# Patient Record
Sex: MALE | Race: WHITE | NOT HISPANIC OR LATINO | Employment: OTHER | URBAN - METROPOLITAN AREA
[De-identification: names, ages, dates, MRNs, and addresses within clinical notes are randomized per-mention and may not be internally consistent; named-entity substitution may affect disease eponyms.]

---

## 2023-10-25 ENCOUNTER — APPOINTMENT (OUTPATIENT)
Dept: CT IMAGING | Facility: HOSPITAL | Age: 73
End: 2023-10-25
Payer: OTHER GOVERNMENT

## 2023-10-25 ENCOUNTER — APPOINTMENT (OUTPATIENT)
Dept: GENERAL RADIOLOGY | Facility: HOSPITAL | Age: 73
End: 2023-10-25
Payer: OTHER GOVERNMENT

## 2023-10-25 ENCOUNTER — HOSPITAL ENCOUNTER (EMERGENCY)
Facility: HOSPITAL | Age: 73
Discharge: HOME | End: 2023-10-25
Attending: EMERGENCY MEDICINE | Admitting: EMERGENCY MEDICINE
Payer: OTHER GOVERNMENT

## 2023-10-25 VITALS
HEIGHT: 70 IN | HEART RATE: 59 BPM | BODY MASS INDEX: 26.63 KG/M2 | WEIGHT: 186 LBS | OXYGEN SATURATION: 97 % | DIASTOLIC BLOOD PRESSURE: 93 MMHG | RESPIRATION RATE: 16 BRPM | SYSTOLIC BLOOD PRESSURE: 142 MMHG | TEMPERATURE: 97.5 F

## 2023-10-25 DIAGNOSIS — F07.81 POST CONCUSSION SYNDROME: Primary | ICD-10-CM

## 2023-10-25 DIAGNOSIS — S40.011A CONTUSION OF RIGHT SHOULDER, INITIAL ENCOUNTER: ICD-10-CM

## 2023-10-25 DIAGNOSIS — S90.121A CONTUSION OF FIFTH TOE OF RIGHT FOOT, INITIAL ENCOUNTER: ICD-10-CM

## 2023-10-25 LAB
ALBUMIN SERPL-MCNC: 4 G/DL (ref 3.5–5.2)
ALBUMIN/GLOB SERPL: 1.4 G/DL
ALP SERPL-CCNC: 73 U/L (ref 39–117)
ALT SERPL W P-5'-P-CCNC: 19 U/L (ref 1–41)
ANION GAP SERPL CALCULATED.3IONS-SCNC: 9 MMOL/L (ref 5–15)
AST SERPL-CCNC: 21 U/L (ref 1–40)
BASOPHILS # BLD AUTO: 0.03 10*3/MM3 (ref 0–0.2)
BASOPHILS NFR BLD AUTO: 0.5 % (ref 0–1.5)
BILIRUB SERPL-MCNC: 0.5 MG/DL (ref 0–1.2)
BUN SERPL-MCNC: 10 MG/DL (ref 8–23)
BUN/CREAT SERPL: 10.3 (ref 7–25)
CALCIUM SPEC-SCNC: 9.4 MG/DL (ref 8.6–10.5)
CHLORIDE SERPL-SCNC: 99 MMOL/L (ref 98–107)
CO2 SERPL-SCNC: 27 MMOL/L (ref 22–29)
CREAT SERPL-MCNC: 0.97 MG/DL (ref 0.76–1.27)
DEPRECATED RDW RBC AUTO: 40.8 FL (ref 37–54)
EGFRCR SERPLBLD CKD-EPI 2021: 83.5 ML/MIN/1.73
EOSINOPHIL # BLD AUTO: 0.06 10*3/MM3 (ref 0–0.4)
EOSINOPHIL NFR BLD AUTO: 1 % (ref 0.3–6.2)
ERYTHROCYTE [DISTWIDTH] IN BLOOD BY AUTOMATED COUNT: 12.7 % (ref 12.3–15.4)
GLOBULIN UR ELPH-MCNC: 2.8 GM/DL
GLUCOSE SERPL-MCNC: 102 MG/DL (ref 65–99)
HCT VFR BLD AUTO: 44.5 % (ref 37.5–51)
HGB BLD-MCNC: 15.2 G/DL (ref 13–17.7)
HOLD SPECIMEN: NORMAL
HOLD SPECIMEN: NORMAL
IMM GRANULOCYTES # BLD AUTO: 0.02 10*3/MM3 (ref 0–0.05)
IMM GRANULOCYTES NFR BLD AUTO: 0.3 % (ref 0–0.5)
LYMPHOCYTES # BLD AUTO: 0.95 10*3/MM3 (ref 0.7–3.1)
LYMPHOCYTES NFR BLD AUTO: 15.6 % (ref 19.6–45.3)
MCH RBC QN AUTO: 30.1 PG (ref 26.6–33)
MCHC RBC AUTO-ENTMCNC: 34.2 G/DL (ref 31.5–35.7)
MCV RBC AUTO: 88.1 FL (ref 79–97)
MONOCYTES # BLD AUTO: 0.45 10*3/MM3 (ref 0.1–0.9)
MONOCYTES NFR BLD AUTO: 7.4 % (ref 5–12)
NEUTROPHILS NFR BLD AUTO: 4.57 10*3/MM3 (ref 1.7–7)
NEUTROPHILS NFR BLD AUTO: 75.2 % (ref 42.7–76)
NRBC BLD AUTO-RTO: 0 /100 WBC (ref 0–0.2)
PLATELET # BLD AUTO: 149 10*3/MM3 (ref 140–450)
PMV BLD AUTO: 9.6 FL (ref 6–12)
POTASSIUM SERPL-SCNC: 4.3 MMOL/L (ref 3.5–5.2)
PROT SERPL-MCNC: 6.8 G/DL (ref 6–8.5)
QT INTERVAL: 425 MS
QTC INTERVAL: 414 MS
RBC # BLD AUTO: 5.05 10*6/MM3 (ref 4.14–5.8)
SODIUM SERPL-SCNC: 135 MMOL/L (ref 136–145)
TROPONIN T SERPL HS-MCNC: 10 NG/L
WBC NRBC COR # BLD: 6.08 10*3/MM3 (ref 3.4–10.8)
WHOLE BLOOD HOLD COAG: NORMAL
WHOLE BLOOD HOLD SPECIMEN: NORMAL

## 2023-10-25 PROCEDURE — 90471 IMMUNIZATION ADMIN: CPT | Performed by: PHYSICIAN ASSISTANT

## 2023-10-25 PROCEDURE — 93005 ELECTROCARDIOGRAM TRACING: CPT

## 2023-10-25 PROCEDURE — 84484 ASSAY OF TROPONIN QUANT: CPT | Performed by: EMERGENCY MEDICINE

## 2023-10-25 PROCEDURE — 73630 X-RAY EXAM OF FOOT: CPT

## 2023-10-25 PROCEDURE — 90715 TDAP VACCINE 7 YRS/> IM: CPT | Performed by: PHYSICIAN ASSISTANT

## 2023-10-25 PROCEDURE — 99284 EMERGENCY DEPT VISIT MOD MDM: CPT

## 2023-10-25 PROCEDURE — 93010 ELECTROCARDIOGRAM REPORT: CPT | Performed by: INTERNAL MEDICINE

## 2023-10-25 PROCEDURE — 73030 X-RAY EXAM OF SHOULDER: CPT

## 2023-10-25 PROCEDURE — 36415 COLL VENOUS BLD VENIPUNCTURE: CPT

## 2023-10-25 PROCEDURE — 25810000003 SODIUM CHLORIDE 0.9 % SOLUTION: Performed by: EMERGENCY MEDICINE

## 2023-10-25 PROCEDURE — 70450 CT HEAD/BRAIN W/O DYE: CPT

## 2023-10-25 PROCEDURE — 85025 COMPLETE CBC W/AUTO DIFF WBC: CPT | Performed by: EMERGENCY MEDICINE

## 2023-10-25 PROCEDURE — 25010000002 TETANUS-DIPHTH-ACELL PERTUSSIS 5-2.5-18.5 LF-MCG/0.5 SUSPENSION PREFILLED SYRINGE: Performed by: PHYSICIAN ASSISTANT

## 2023-10-25 PROCEDURE — 72125 CT NECK SPINE W/O DYE: CPT

## 2023-10-25 PROCEDURE — 80053 COMPREHEN METABOLIC PANEL: CPT | Performed by: EMERGENCY MEDICINE

## 2023-10-25 PROCEDURE — 71045 X-RAY EXAM CHEST 1 VIEW: CPT

## 2023-10-25 RX ORDER — SODIUM CHLORIDE 0.9 % (FLUSH) 0.9 %
10 SYRINGE (ML) INJECTION AS NEEDED
Status: DISCONTINUED | OUTPATIENT
Start: 2023-10-25 | End: 2023-10-25 | Stop reason: HOSPADM

## 2023-10-25 RX ADMIN — TETANUS TOXOID, REDUCED DIPHTHERIA TOXOID AND ACELLULAR PERTUSSIS VACCINE, ADSORBED 0.5 ML: 5; 2.5; 8; 8; 2.5 SUSPENSION INTRAMUSCULAR at 13:53

## 2023-10-25 RX ADMIN — SODIUM CHLORIDE 500 ML: 9 INJECTION, SOLUTION INTRAVENOUS at 13:02

## 2023-10-25 NOTE — ED PROVIDER NOTES
"The TOYA and I have discussed this patient's history, physical exam, and treatment plan.  I have reviewed the documentation and personally had a face to face interaction with the patient. I affirm the documentation and agree with the treatment and plan.  The attached note describes my personal findings.      I provided a substantive portion of the care of the patient.  I personally performed the physical exam in its entirety, and below are my findings.     Brief history of present illness: 73-year-old male presents with injury sustained from a fall today at work.  Since the injury which included bumping his head he felt a little bit woozy but feels better now.  Denies headache, focal numbness or weakness, chest pain or shortness of breath.  He bumped the right side of his forehead, sustained an acute on chronic injury to his right shoulder and hit his right foot and complains of some acute on chronic first MTP pain.    Physical exam:    /74   Pulse 60   Temp 97.5 °F (36.4 °C)   Resp 16   Ht 177.8 cm (70\")   Wt 84.4 kg (186 lb)   SpO2 100%   BMI 26.69 kg/m²       Physical Exam   Constitutional: No distress.  Nontoxic  HENT:  Head: Normocephalic .  Small hematoma right forehead with no depressed lesion or other findings of basilar skull fracture  Oropharynx: Mucous membranes are moist.   Eyes: . No scleral icterus.   Neck: Normal range of motion. Neck supple.   Cardiovascular: Pink warm and well perfused throughout.    Pulmonary/Chest: No respiratory distress.    Abdominal: Soft. There is no rebound or rigidity  Musculoskeletal: See discomfort with palpation all about the right shoulder with no gross deformity appreciated..  No deformity of the toes of the right foot.  Neurological: Alert and oriented.  Speech fluent and easily intelligible  Skin: Skin is pink, warm, and dry.   Psychiatric: Mood and affect normal.   Nursing note and vitals reviewed.        MDM: Agree with planned laboratory and radiologic " evaluation    Please note that portions of this were completed with a voice recognition program.       Note Disclaimer: At [facility], we believe that sharing information builds trust and better relationships. You are receiving this note because you are receiving care at [facility] or recently visited. It is possible you will see health information before a provider has talked with you about it. This kind of information can be easy to misunderstand. To help you fully understand what it means for your health, we urge you to discuss this note with your provider.     Rashard Villegas MD  10/25/23 4258

## 2023-10-25 NOTE — CASE MANAGEMENT/SOCIAL WORK
ER provider notified that patient has [employer] insurance. CCP to follow up should patient require admission to the hospital today. AGUEDA Curry RN

## 2023-10-25 NOTE — ED TRIAGE NOTES
Patient to ED via EMS from work. Patient had a syncopal episode while working. Patient reports he hit the right side of his head. Patient reports he did have tunnel vision and was able to come to before fully passing out.

## 2023-10-25 NOTE — ED PROVIDER NOTES
EMERGENCY DEPARTMENT ENCOUNTER    Room Number:  32/32  PCP: Provider, No Known  Discussed/ obtained information from independent historians: patient      HPI:  Chief Complaint: fall down stairs  A complete HPI/ROS/PMH/PSH/SH/FH are unobtainable due to: none  Context: Frank Lawrence is a 73 y.o. male who presents to the ED c/o injuries related to a fall downstairs.  He states he was at the top of some steps clearing some cobwebs while at work.  He stepped off the top stair causing him to fall down 9 concrete stairs.  He has pretty significant right shoulder pain, did strike his head but denies any loss of consciousness nausea vomiting or vision changes.  Denies any neck or back pain chest pain or abdomen pain.  He does have an abrasion to his right knee and his right great toe is hurting.  He is not anticoagulated.  After the fall he was ambulatory, put away his ladder and tools, walked his van and then came to the ER via ambulance for further evaluation and treatment.  He did have a couple of near syncopal episodes after the injury where he felt like he was going to pass out but ultimately did not.  He denies any chest pain or shortness of breath.  Patient does not recall ever having a tetanus vaccination      External (non-ED) record review: Immunization record reviewed, no tetanus vaccination on file.      PAST MEDICAL HISTORY  Active Ambulatory Problems     Diagnosis Date Noted    No Active Ambulatory Problems     Resolved Ambulatory Problems     Diagnosis Date Noted    No Resolved Ambulatory Problems     No Additional Past Medical History         PAST SURGICAL HISTORY  No past surgical history on file.      FAMILY HISTORY  No family history on file.      SOCIAL HISTORY  Social History     Socioeconomic History    Marital status:          ALLERGIES  Patient has no known allergies.        REVIEW OF SYSTEMS  Review of Systems         PHYSICAL EXAM  ED Triage Vitals [10/25/23 1040]   Temp Heart Rate Resp BP  SpO2   97.5 °F (36.4 °C) 60 16 131/74 100 %      Temp src Heart Rate Source Patient Position BP Location FiO2 (%)   -- -- -- -- --       Physical Exam      GENERAL: no acute distress  HENT: normocephalic, small area contusion to the right frontal forehead, approximately the size of a nickel.  No hemotympanum davis sign raccoon eyes septal hematoma otorrhea or rhinorrhea, no facial bone tenderness or deformity  EYES: no scleral icterus, PERRL, extract movements intact  CV: regular rhythm, normal rate  RESPIRATORY: normal effort, CTA B, no chest wall tenderness  ABDOMEN: nondistended soft nontender, no signs of trauma  MUSCULOSKELETAL: no deformity, no C, T, L-spine tenderness.  Full range of motion of the neck without pain.  There is a superficial abrasion to the right knee, right knee is otherwise nontender with full range of motion.  He has some tenderness to the right great toe.  No other tenderness to the foot, DP PT pulses 2+, extremities are otherwise atraumatic with full range of motion and nontender.  NEURO: alert, moves all extremities, follows commands  PSYCH:  calm, cooperative  SKIN: warm, dry    Vital signs and nursing notes reviewed.          LAB RESULTS  Recent Results (from the past 24 hour(s))   ECG 12 Lead Syncope    Collection Time: 10/25/23 10:46 AM   Result Value Ref Range    QT Interval 425 ms    QTC Interval 414 ms   Comprehensive Metabolic Panel    Collection Time: 10/25/23 11:21 AM    Specimen: Blood   Result Value Ref Range    Glucose 102 (H) 65 - 99 mg/dL    BUN 10 8 - 23 mg/dL    Creatinine 0.97 0.76 - 1.27 mg/dL    Sodium 135 (L) 136 - 145 mmol/L    Potassium 4.3 3.5 - 5.2 mmol/L    Chloride 99 98 - 107 mmol/L    CO2 27.0 22.0 - 29.0 mmol/L    Calcium 9.4 8.6 - 10.5 mg/dL    Total Protein 6.8 6.0 - 8.5 g/dL    Albumin 4.0 3.5 - 5.2 g/dL    ALT (SGPT) 19 1 - 41 U/L    AST (SGOT) 21 1 - 40 U/L    Alkaline Phosphatase 73 39 - 117 U/L    Total Bilirubin 0.5 0.0 - 1.2 mg/dL    Globulin 2.8  gm/dL    A/G Ratio 1.4 g/dL    BUN/Creatinine Ratio 10.3 7.0 - 25.0    Anion Gap 9.0 5.0 - 15.0 mmol/L    eGFR 83.5 >60.0 mL/min/1.73   Single High Sensitivity Troponin T    Collection Time: 10/25/23 11:21 AM    Specimen: Blood   Result Value Ref Range    HS Troponin T 10 <15 ng/L   Green Top (Gel)    Collection Time: 10/25/23 11:21 AM   Result Value Ref Range    Extra Tube Hold for add-ons.    Lavender Top    Collection Time: 10/25/23 11:21 AM   Result Value Ref Range    Extra Tube hold for add-on    Gold Top - SST    Collection Time: 10/25/23 11:21 AM   Result Value Ref Range    Extra Tube Hold for add-ons.    Light Blue Top    Collection Time: 10/25/23 11:21 AM   Result Value Ref Range    Extra Tube Hold for add-ons.    CBC Auto Differential    Collection Time: 10/25/23 11:21 AM    Specimen: Blood   Result Value Ref Range    WBC 6.08 3.40 - 10.80 10*3/mm3    RBC 5.05 4.14 - 5.80 10*6/mm3    Hemoglobin 15.2 13.0 - 17.7 g/dL    Hematocrit 44.5 37.5 - 51.0 %    MCV 88.1 79.0 - 97.0 fL    MCH 30.1 26.6 - 33.0 pg    MCHC 34.2 31.5 - 35.7 g/dL    RDW 12.7 12.3 - 15.4 %    RDW-SD 40.8 37.0 - 54.0 fl    MPV 9.6 6.0 - 12.0 fL    Platelets 149 140 - 450 10*3/mm3    Neutrophil % 75.2 42.7 - 76.0 %    Lymphocyte % 15.6 (L) 19.6 - 45.3 %    Monocyte % 7.4 5.0 - 12.0 %    Eosinophil % 1.0 0.3 - 6.2 %    Basophil % 0.5 0.0 - 1.5 %    Immature Grans % 0.3 0.0 - 0.5 %    Neutrophils, Absolute 4.57 1.70 - 7.00 10*3/mm3    Lymphocytes, Absolute 0.95 0.70 - 3.10 10*3/mm3    Monocytes, Absolute 0.45 0.10 - 0.90 10*3/mm3    Eosinophils, Absolute 0.06 0.00 - 0.40 10*3/mm3    Basophils, Absolute 0.03 0.00 - 0.20 10*3/mm3    Immature Grans, Absolute 0.02 0.00 - 0.05 10*3/mm3    nRBC 0.0 0.0 - 0.2 /100 WBC       Ordered the above labs and reviewed the results.        RADIOLOGY  CT Head Without Contrast, CT Cervical Spine Without Contrast    Result Date: 10/25/2023  CT HEAD WO CONTRAST-, CT CERVICAL SPINE WO CONTRAST-  INDICATIONS: Trauma    TECHNIQUE: Radiation dose reduction techniques were utilized, including automated exposure control and exposure modulation based on body size. Noncontrast head CT, cervical spine CT  COMPARISON: None available  FINDINGS:  Head CT:  No acute intracranial hemorrhage, midline shift or mass effect. No acute territorial infarct is identified.  Arterial calcifications are seen at the base of the brain.  Ventricles, cisterns, cerebral sulci are unremarkable for patient age.  The visualized paranasal sinuses, orbits, mastoid air cells are unremarkable.   Cervical spine CT:  No acute fracture is identified.  Facet and uncovertebral joint hypertrophy contribute to neuroforaminal narrowing, more prominent on the right at C3/4, C5/6, and on the left at C3/4, C4/5, C6/7. Disc osteophyte complex appears to result in mild central stenosis at C5/6, C6/7.   Bilateral carotid arterial calcifications are present.        No acute intracranial hemorrhage or hydrocephalus. No acute cervical fracture identified; degenerative changes of the cervical spine. If there is further clinical concern, MRI could be considered for further evaluation.  This report was finalized on 10/25/2023 1:22 PM by Dr. Tavo Linda M.D on Workstation: [id number]      XR Shoulder 2+ View Right, XR Foot 3+ View Right, XR Chest 1 View    Result Date: 10/25/2023  PORTABLE CHEST X-RAY; RIGHT SHOULDER AND RIGHT FOOT X-RAYS  HISTORY: Fall, pain in all of these locations..  Portable chest x-ray is provided. Correlation: None.  FINDINGS: The cardiomediastinal silhouette is normal. The lungs are clear. The costophrenic sulci are dry and the bones appear normal. There is no pneumothorax.  At the right shoulder, there is no osseous, articular, or soft tissue abnormality.  At the right foot, the midfoot is normally aligned. 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 No evidence of fracture.      Negative.  This report was finalized  on 10/25/2023 12:55 PM by Dr. Maynor Talley M.D on Workstation: [id number]       Ordered the above noted radiological studies. Reviewed by me in PACS.            PROCEDURES  Procedures              MEDICATIONS GIVEN IN ER  Medications   Tetanus-Diphth-Acell Pertussis (BOOSTRIX) injection 0.5 mL (0.5 mL Intramuscular Given 10/25/23 1353)   sodium chloride 0.9 % bolus 500 mL (0 mL Intravenous Stopped 10/25/23 1332)                   MEDICAL DECISION MAKING, PROGRESS, and CONSULTS    All labs have been independently reviewed by me.  All radiology studies have been reviewed by me and I have also reviewed the radiology report.   EKG's independently viewed and interpreted by me.  Discussion below represents my analysis of pertinent findings related to patient's condition, differential diagnosis, treatment plan and final disposition.            Orders placed during this visit:  Orders Placed This Encounter   Procedures    [facility] 02.  Shoulder Immobilizer/Sling    CT Head Without Contrast    CT Cervical Spine Without Contrast    XR Shoulder 2+ View Right    XR Foot 3+ View Right    XR Chest 1 View    [place] Draw    Comprehensive Metabolic Panel    Single High Sensitivity Troponin T    CBC Auto Differential    Undress & Gown    Continuous Pulse Oximetry    Vital Signs    Irrigate wound    Wound Dressing    Ambulate patient    ECG 12 Lead Syncope    CBC & Differential    Green Top (Gel)    Lavender Top    Gold Top - SST    Light Blue Top           Differential diagnosis:  Head contusion, skull fracture, intracranial hemorrhage, concussion, occult C-spine fracture, toe sprain, toe contusion, toe fracture, shoulder dislocation, shoulder contusion, shoulder sprain, humerus fracture      Independent interpretation of labs, radiology studies, and discussions with consultants:  ED Course as of 10/25/23 1950   Wed Oct 25, 2023   1143 WBC: 6.08 [KA]   1143 Hemoglobin: 15.2 [KA]   1339 HS Troponin T: 10 [KA]   1339  Glucose(!): 102 [KA]   1339 Creatinine: 0.97 [KA]   1340 My independent interpretation of the right shoulder x-rays is no fracture or dislocation, right foot x-rays reveal no fracture, chest x-ray shows no cardiomegaly, pneumothorax or acute infiltrate [KA]   1341 My independent interpretation of the EKG performed at 10:46 AM is sinus rhythm rate 57 normal axis, normal intervals, no ST elevation, no prior for comparison. [KA]      ED Course User Index  [KA] Britni Dennison PA     I reassessed the patient, he is resting.  I counseled him about all of his lab and imaging results.  He is ambulatory about the ER without difficulty or recurrent lightheadedness.  Suspect postconcussive symptoms.  He is mentating normally, sling applied for his right shoulder injury and I have referred him to orthopedics.  He has an appointment with a new see PCP Early next week and have encouraged him to keep that follow-up appointment.  I have counseled him on home care follow-up and indications for return and he is stable for discharge.    - Shared decision making: Offered treatment for pain, patient declined     Additional orders considered but not ordered:  considered admission-not indicated      Additional sources:    - Chronic or social conditions impacting care: History of prior head injury          DIAGNOSIS  Final diagnoses:   Post concussion syndrome   Contusion of right shoulder, initial encounter   Contusion of fifth toe of right foot, initial encounter           Follow Up:  Christian Jimenez MD  [address] [address] [phone]    Schedule an appointment as soon as possible for a visit       Your PCP  as scheduled next week        [facility] EMERGENCY DEPARTMENT  [address] [facility] [address] [phone]    If symptoms worsen or any concerns      RX:     Medication List      No changes were made to your prescriptions during this visit.         Latest Documented  Vital Signs:  As of 19:50 EDT  BP- 142/93 HR- 59 Temp- 97.5 °F (36.4 °C) O2 sat- 97%              --    Please note that portions of this were completed with a voice recognition program.       Note Disclaimer: At [facility], we believe that sharing information builds trust and better relationships. You are receiving this note because you are receiving care at [facility] or recently visited. It is possible you will see health information before a provider has talked with you about it. This kind of information can be easy to misunderstand. To help you fully understand what it means for your health, we urge you to discuss this note with your provider.             Britni Dennison PA  10/1950

## 2023-10-25 NOTE — ED TRIAGE NOTES
Patient reports that he was cleaning cobwebs and when coming down, he miss stepped and fell down down 9 concrete steps. Patient reports that it was after that fall that he became light headed and had a near syncopal episode. Patient is not on blood thinners and did not lose consciousness.